# Patient Record
Sex: FEMALE
[De-identification: names, ages, dates, MRNs, and addresses within clinical notes are randomized per-mention and may not be internally consistent; named-entity substitution may affect disease eponyms.]

---

## 2017-02-21 ENCOUNTER — RESULT REVIEW (OUTPATIENT)
Age: 37
End: 2017-02-21

## 2017-07-19 PROBLEM — Z00.00 ENCOUNTER FOR PREVENTIVE HEALTH EXAMINATION: Status: ACTIVE | Noted: 2017-07-19

## 2019-03-08 ENCOUNTER — APPOINTMENT (OUTPATIENT)
Dept: PULMONOLOGY | Facility: CLINIC | Age: 39
End: 2019-03-08
Payer: COMMERCIAL

## 2019-03-08 VITALS
SYSTOLIC BLOOD PRESSURE: 134 MMHG | RESPIRATION RATE: 18 BRPM | HEIGHT: 65 IN | BODY MASS INDEX: 35.99 KG/M2 | DIASTOLIC BLOOD PRESSURE: 82 MMHG | WEIGHT: 216 LBS | HEART RATE: 81 BPM | OXYGEN SATURATION: 100 %

## 2019-03-08 DIAGNOSIS — R05 COUGH: ICD-10-CM

## 2019-03-08 PROCEDURE — 94729 DIFFUSING CAPACITY: CPT

## 2019-03-08 PROCEDURE — 94060 EVALUATION OF WHEEZING: CPT

## 2019-03-08 PROCEDURE — 99204 OFFICE O/P NEW MOD 45 MIN: CPT | Mod: 25

## 2019-03-08 PROCEDURE — 94727 GAS DIL/WSHOT DETER LNG VOL: CPT

## 2019-03-08 NOTE — HISTORY OF PRESENT ILLNESS
[FreeTextEntry1] : She is a 38-year-old woman. She has been coughing for about one month. Cough for the most part is nonproductive. She has tried several different medications. Some have helped but her cough has not completely resolved. Has never had any fever, chills or sweating. No flulike illness. Denies any sinus congestion or postnasal drip. Denies any reflux symptoms. She never smoked. No prior history of pulmonary problems. Her occupational history is unremarkable.

## 2019-03-08 NOTE — REVIEW OF SYSTEMS
[Cough] : cough [Fever] : no fever [Sputum] : not coughing up ~M sputum [Dyspnea] : no dyspnea [Wheezing] : no wheezing [Edema] : ~T edema was not present [Nasal Discharge] : no nasal discharge [Heartburn] : no heartburn [Myalgias] : no myalgias [Rash] : no [unfilled] rash [Depression] : no depression [Diabetes] : no diabetes mellitus [Thyroid Problem] : no thyroid problem [DVT] : no DVT [Difficulty Initiating Sleep] : no difficulty falling asleep [Difficulty Maintaining Sleep] : no difficulty maintaining sleep

## 2019-03-08 NOTE — PHYSICAL EXAM
[General Appearance - In No Acute Distress] : no acute distress [Neck Cervical Mass (___cm)] : no neck mass was observed [Heart Sounds] : normal S1 and S2 [Auscultation Breath Sounds / Voice Sounds] : lungs were clear to auscultation bilaterally [Bowel Sounds] : normal bowel sounds [Abdomen Tenderness] : non-tender [Abnormal Walk] : normal gait [Cyanosis, Localized] : no localized cyanosis [Skin Turgor] : normal skin turgor [No Focal Deficits] : no focal deficits [Oriented To Time, Place, And Person] : oriented to person, place, and time [Erythema] : no erythema of the pharynx

## 2019-03-08 NOTE — DISCUSSION/SUMMARY
[FreeTextEntry1] : She is a 38-year-old woman who has been coughing for about one month. The cough is nonproductive. She has no prior history of pulmonary problems.\par \par On examination her lungs were clear. Spirometry was normal although there was a minimal improvement after bronchodilator.\par \par The history and examination are suggestive of reactive airways disease. She was given a trial of Symbicort. A chest radiograph will be obtained. She is to call me if the cough does not resolve with a trial of simple clock.

## 2019-03-08 NOTE — PROCEDURE
[FreeTextEntry1] : Pulmonary function test March 8, 2019. Spirometry was normal. Mild restriction was present. Diffusion was normal. No significant change after administration of a bronchodilator.FEF 50 improved by 18%.

## 2021-03-11 ENCOUNTER — RESULT REVIEW (OUTPATIENT)
Age: 41
End: 2021-03-11

## 2024-03-15 ENCOUNTER — APPOINTMENT (OUTPATIENT)
Dept: OBGYN | Facility: CLINIC | Age: 44
End: 2024-03-15
Payer: COMMERCIAL

## 2024-03-15 VITALS
DIASTOLIC BLOOD PRESSURE: 90 MMHG | BODY MASS INDEX: 35.32 KG/M2 | HEIGHT: 65 IN | SYSTOLIC BLOOD PRESSURE: 130 MMHG | WEIGHT: 212 LBS

## 2024-03-15 DIAGNOSIS — Z82.49 FAMILY HISTORY OF ISCHEMIC HEART DISEASE AND OTHER DISEASES OF THE CIRCULATORY SYSTEM: ICD-10-CM

## 2024-03-15 DIAGNOSIS — I10 ESSENTIAL (PRIMARY) HYPERTENSION: ICD-10-CM

## 2024-03-15 DIAGNOSIS — D25.9 LEIOMYOMA OF UTERUS, UNSPECIFIED: ICD-10-CM

## 2024-03-15 DIAGNOSIS — Z78.9 OTHER SPECIFIED HEALTH STATUS: ICD-10-CM

## 2024-03-15 PROCEDURE — 99205 OFFICE O/P NEW HI 60 MIN: CPT

## 2024-03-17 PROBLEM — I10 CHRONIC HYPERTENSION: Status: ACTIVE | Noted: 2024-03-16

## 2024-03-17 PROBLEM — D25.9 FIBROID UTERUS: Status: ACTIVE | Noted: 2024-03-17

## 2024-03-17 RX ORDER — MEDROXYPROGESTERONE ACETATE 10 MG/1
10 TABLET ORAL
Refills: 0 | Status: ACTIVE | COMMUNITY

## 2024-03-17 RX ORDER — OLMESARTAN MEDOXOMIL 40 MG/1
TABLET, FILM COATED ORAL
Refills: 0 | Status: ACTIVE | COMMUNITY

## 2024-03-17 RX ORDER — NIFEDIPINE 90 MG/1
TABLET, EXTENDED RELEASE ORAL
Refills: 0 | Status: ACTIVE | COMMUNITY

## 2024-03-17 NOTE — DISCUSSION/SUMMARY
[FreeTextEntry1] : 42 y/o  with fibroid uterus, failed management with Mirena IUD, presenting to discuss hysterectomy. - Counseled on TLH as above - Discussed timing of surgery. Advised that in context of need to delay surgery until August, I would agree with plan for Lupron injection to stop bleeding until that time. Advised Lupron will shrink uterus as well, which does make surgery simpler, but that even at current size TLH can be accomplished, primary reason for Lupron from my perspective would be to stop bleeding prior to surgery. - EMB with only scant benign stromal cells, but in context of long Mirena use, fibroids as clear source of bleeding, and 8 mm endometrium on sono, this result is sufficient to proceed with hysterectomy. - Last pap normal 3/11/22 per scanned records - Will need medical clearance for surgery - Continue with plan for Lupron with Dr. Rust - Will have pre-op visit prior to surgery given scheduling far out from now  Dominic Weller MD

## 2024-03-17 NOTE — CONSULT LETTER
[Dear  ___] : Dear  [unfilled], [( Thank you for referring [unfilled] for consultation for _____ )] : Thank you for referring [unfilled] for consultation for [unfilled] [Consult Closing:] : Thank you very much for allowing me to participate in the care of this patient.  If you have any questions, please do not hesitate to contact me. [Please see my note below.] : Please see my note below. [Sincerely,] : Sincerely, [FreeTextEntry3] : Dominic Weller MD [FreeTextEntry1] : I have seen her in my office today and believe she is a good candidate for total laparoscopic hysterectomy. She has expressed to me your plan to administer Depot Lupron to her and her plan to likely delay surgery until August. I would agree that Lupron is a good idea in the interim to stop her bleeding prior her surgery since she needs to wait so long to schedule it. Shrinking the fibroids will also be nice, but she would still be a candidate for laparoscopic hysterectomy even at the current uterine size, I primarily recommended she stick with the Lupron plan to prevent worsening of her bleeding prior to surgery and advised she can keep her appointment to have that injected in your office as planned. My office will be working on scheduling her procedure, and once it is completed, I will be returning her to your excellent care.

## 2024-03-17 NOTE — COUNSELING
[TextEntry] : I sat down with the patient to discuss the imaging findings & her symptoms which warrant surgical intervention. I explained that her symptoms are likely related to her uterine pathology. She has failed medical interventions and desires hysterectomy. We discussed type of hysterectomy including total and supracervical. We reviewed that there is no proven medical benefit to keeping the cervix, and removal in the future is more difficult. I recommend definitive hysterectomy with bilateral salpingectomy via a minimally invasive approach.  The options for surgical approach including open, vaginal, and laparoscopic with or without robotic assistance were discussed and the patient agrees with plan for laparoscopic hysterectomy with bilateral salpingectomy. The differential diagnosis was discussed in detail. The indications, risks, benefits and alternatives were discussed. Including but not limited to conversion to laparotomy, bleeding, infection, injury to surrounding organs was discussed at length. Chance of occult injury and need for future surgery. Patient expressed an understanding of the treatment rationale and her questions were answered to her apparent satisfaction. She was given written information about postoperative care and diagrams of the pelvic anatomy.

## 2024-03-17 NOTE — PHYSICAL EXAM
[Chaperone Present] : A chaperone was present in the examining room during all aspects of the physical examination [Alert] : alert [Appropriately responsive] : appropriately responsive [No Acute Distress] : no acute distress [Oriented x3] : oriented x3 [Labia Minora] : normal [Labia Majora] : normal [Normal] : normal [FreeTextEntry6] : 18w sized mobile uterus, no adnexal masses

## 2024-03-17 NOTE — HISTORY OF PRESENT ILLNESS
[Patient reported mammogram was normal] : Patient reported mammogram was normal [Patient reported breast sonogram was normal] : Patient reported breast sonogram was normal [Patient reported PAP Smear was normal] : Patient reported PAP Smear was normal [LMP unknown] : LMP unknown [N] : Patient does not use contraception [Y] : Positive pregnancy history [unknown] : Patient is unsure of the date of her LMP [Menarche Age: ____] : age at menarche was [unfilled] [No] : Patient does not have concerns regarding sex [Currently Active] : currently active [Mammogramdate] : 2023 [FreeTextEntry1] : 42 y/o  with heavy, irregular bleeding presenting for consultation for hysterectomy. Patient is referred by Dr. Elly Rust. Patient has known fibroids for a long time. Had Mirena inserted 2022 for this which helped with bleeding a lot until 2023 where between this period she had heavy and irregular bleeding. Ultimately saw Dr. Rust, who checked a Hgb on  that was 11.6, and did an EMB on  that returned with mostly blood and "scanty strips of benign stromal tissue." She was prescribed 10 mg of nighly Provera on top of Mirena and bleeding has now stopped with this regimen. Given worsening of her bleeding however, she was referred for hysterectomy.  Of note, for logistic reasons with work and , patient is interested in surgery more in early August. As such, pt currently has plan for Depot Lupron injection with Dr. Rust later this month, and likely a second injection in the summer.  Pt had sono on 24 (scanned in) that showed multi-fibroid uterus, endometrium of 8 mm, and normal ovaries b/l  OBHx:  x1 [TextBox_19] : as per pt [TextBox_25] : as per pt [BreastSonogramDate] : 2023 [PGxTotal] : 1 [PapSmeardate] : 2023 [TextBox_31] : as per pt [[facility] Centeriving] : 1 [[facility] CenterxFulerm] : 1

## 2024-04-04 ENCOUNTER — TRANSCRIPTION ENCOUNTER (OUTPATIENT)
Age: 44
End: 2024-04-04

## 2024-07-18 ENCOUNTER — OUTPATIENT (OUTPATIENT)
Dept: OUTPATIENT SERVICES | Facility: HOSPITAL | Age: 44
LOS: 1 days | End: 2024-07-18
Payer: COMMERCIAL

## 2024-07-18 VITALS
DIASTOLIC BLOOD PRESSURE: 70 MMHG | WEIGHT: 214.95 LBS | RESPIRATION RATE: 20 BRPM | OXYGEN SATURATION: 99 % | HEIGHT: 66 IN | SYSTOLIC BLOOD PRESSURE: 120 MMHG | HEART RATE: 82 BPM | TEMPERATURE: 98 F

## 2024-07-18 DIAGNOSIS — Z29.9 ENCOUNTER FOR PROPHYLACTIC MEASURES, UNSPECIFIED: ICD-10-CM

## 2024-07-18 DIAGNOSIS — Z92.89 PERSONAL HISTORY OF OTHER MEDICAL TREATMENT: Chronic | ICD-10-CM

## 2024-07-18 DIAGNOSIS — I10 ESSENTIAL (PRIMARY) HYPERTENSION: ICD-10-CM

## 2024-07-18 DIAGNOSIS — Z13.89 ENCOUNTER FOR SCREENING FOR OTHER DISORDER: ICD-10-CM

## 2024-07-18 DIAGNOSIS — D25.9 LEIOMYOMA OF UTERUS, UNSPECIFIED: ICD-10-CM

## 2024-07-18 DIAGNOSIS — Z01.818 ENCOUNTER FOR OTHER PREPROCEDURAL EXAMINATION: ICD-10-CM

## 2024-07-18 LAB
A1C WITH ESTIMATED AVERAGE GLUCOSE RESULT: 5.7 % — HIGH (ref 4–5.6)
ALBUMIN SERPL ELPH-MCNC: 4.4 G/DL — SIGNIFICANT CHANGE UP (ref 3.3–5.2)
ALP SERPL-CCNC: 107 U/L — SIGNIFICANT CHANGE UP (ref 40–120)
ALT FLD-CCNC: 14 U/L — SIGNIFICANT CHANGE UP
ANION GAP SERPL CALC-SCNC: 10 MMOL/L — SIGNIFICANT CHANGE UP (ref 5–17)
APTT BLD: 31.6 SEC — SIGNIFICANT CHANGE UP (ref 24.5–35.6)
AST SERPL-CCNC: 19 U/L — SIGNIFICANT CHANGE UP
BASOPHILS # BLD AUTO: 0.04 K/UL — SIGNIFICANT CHANGE UP (ref 0–0.2)
BASOPHILS NFR BLD AUTO: 0.5 % — SIGNIFICANT CHANGE UP (ref 0–2)
BILIRUB SERPL-MCNC: 0.4 MG/DL — SIGNIFICANT CHANGE UP (ref 0.4–2)
BLD GP AB SCN SERPL QL: SIGNIFICANT CHANGE UP
BUN SERPL-MCNC: 8.4 MG/DL — SIGNIFICANT CHANGE UP (ref 8–20)
CALCIUM SERPL-MCNC: 9.7 MG/DL — SIGNIFICANT CHANGE UP (ref 8.4–10.5)
CHLORIDE SERPL-SCNC: 106 MMOL/L — SIGNIFICANT CHANGE UP (ref 96–108)
CO2 SERPL-SCNC: 25 MMOL/L — SIGNIFICANT CHANGE UP (ref 22–29)
CREAT SERPL-MCNC: 0.57 MG/DL — SIGNIFICANT CHANGE UP (ref 0.5–1.3)
EGFR: 114 ML/MIN/1.73M2 — SIGNIFICANT CHANGE UP
EOSINOPHIL # BLD AUTO: 0.06 K/UL — SIGNIFICANT CHANGE UP (ref 0–0.5)
EOSINOPHIL NFR BLD AUTO: 0.7 % — SIGNIFICANT CHANGE UP (ref 0–6)
ESTIMATED AVERAGE GLUCOSE: 117 MG/DL — HIGH (ref 68–114)
GLUCOSE SERPL-MCNC: 89 MG/DL — SIGNIFICANT CHANGE UP (ref 70–99)
HCG SERPL-ACNC: <4 MIU/ML — SIGNIFICANT CHANGE UP
HCT VFR BLD CALC: 38.2 % — SIGNIFICANT CHANGE UP (ref 34.5–45)
HGB BLD-MCNC: 11.8 G/DL — SIGNIFICANT CHANGE UP (ref 11.5–15.5)
IMM GRANULOCYTES NFR BLD AUTO: 0.2 % — SIGNIFICANT CHANGE UP (ref 0–0.9)
INR BLD: 1.14 RATIO — SIGNIFICANT CHANGE UP (ref 0.85–1.18)
LYMPHOCYTES # BLD AUTO: 2.33 K/UL — SIGNIFICANT CHANGE UP (ref 1–3.3)
LYMPHOCYTES # BLD AUTO: 26.9 % — SIGNIFICANT CHANGE UP (ref 13–44)
MCHC RBC-ENTMCNC: 22.5 PG — LOW (ref 27–34)
MCHC RBC-ENTMCNC: 30.9 GM/DL — LOW (ref 32–36)
MCV RBC AUTO: 72.9 FL — LOW (ref 80–100)
MONOCYTES # BLD AUTO: 0.66 K/UL — SIGNIFICANT CHANGE UP (ref 0–0.9)
MONOCYTES NFR BLD AUTO: 7.6 % — SIGNIFICANT CHANGE UP (ref 2–14)
NEUTROPHILS # BLD AUTO: 5.54 K/UL — SIGNIFICANT CHANGE UP (ref 1.8–7.4)
NEUTROPHILS NFR BLD AUTO: 64.1 % — SIGNIFICANT CHANGE UP (ref 43–77)
PLATELET # BLD AUTO: 282 K/UL — SIGNIFICANT CHANGE UP (ref 150–400)
POTASSIUM SERPL-MCNC: 4.5 MMOL/L — SIGNIFICANT CHANGE UP (ref 3.5–5.3)
POTASSIUM SERPL-SCNC: 4.5 MMOL/L — SIGNIFICANT CHANGE UP (ref 3.5–5.3)
PROT SERPL-MCNC: 8 G/DL — SIGNIFICANT CHANGE UP (ref 6.6–8.7)
PROTHROM AB SERPL-ACNC: 12.6 SEC — SIGNIFICANT CHANGE UP (ref 9.5–13)
RBC # BLD: 5.24 M/UL — HIGH (ref 3.8–5.2)
RBC # FLD: 14.5 % — SIGNIFICANT CHANGE UP (ref 10.3–14.5)
SODIUM SERPL-SCNC: 141 MMOL/L — SIGNIFICANT CHANGE UP (ref 135–145)
WBC # BLD: 8.65 K/UL — SIGNIFICANT CHANGE UP (ref 3.8–10.5)
WBC # FLD AUTO: 8.65 K/UL — SIGNIFICANT CHANGE UP (ref 3.8–10.5)

## 2024-07-18 PROCEDURE — 93005 ELECTROCARDIOGRAM TRACING: CPT

## 2024-07-18 PROCEDURE — 85730 THROMBOPLASTIN TIME PARTIAL: CPT

## 2024-07-18 PROCEDURE — 86850 RBC ANTIBODY SCREEN: CPT

## 2024-07-18 PROCEDURE — 85025 COMPLETE CBC W/AUTO DIFF WBC: CPT

## 2024-07-18 PROCEDURE — 86900 BLOOD TYPING SEROLOGIC ABO: CPT

## 2024-07-18 PROCEDURE — 36415 COLL VENOUS BLD VENIPUNCTURE: CPT

## 2024-07-18 PROCEDURE — 86901 BLOOD TYPING SEROLOGIC RH(D): CPT

## 2024-07-18 PROCEDURE — 84702 CHORIONIC GONADOTROPIN TEST: CPT

## 2024-07-18 PROCEDURE — 80053 COMPREHEN METABOLIC PANEL: CPT

## 2024-07-18 PROCEDURE — 85610 PROTHROMBIN TIME: CPT

## 2024-07-18 PROCEDURE — G0463: CPT

## 2024-07-18 PROCEDURE — 93010 ELECTROCARDIOGRAM REPORT: CPT

## 2024-07-18 PROCEDURE — 83036 HEMOGLOBIN GLYCOSYLATED A1C: CPT

## 2024-07-18 RX ORDER — MEDROXYPROGESTERONE ACETATE 2.5 MG/1
1 TABLET ORAL
Refills: 0 | DISCHARGE

## 2024-07-18 RX ORDER — OLMESARTAN MEDOXOMIL 5 MG/1
1 TABLET, FILM COATED ORAL
Refills: 0 | DISCHARGE

## 2024-07-18 RX ORDER — LEUPROLIDE ACETATE 15 MG
0 KIT INTRAMUSCULAR
Refills: 0 | DISCHARGE

## 2024-07-18 RX ORDER — LEVONORGESTREL 1.5 MG/1
1 TABLET ORAL
Refills: 0 | DISCHARGE

## 2024-07-18 NOTE — H&P PST ADULT - ASSESSMENT
CAPRINI SCORE    AGE RELATED RISK FACTORS                                                             [x ] Age 41-60 years                                            (1 Point)  [ ] Age: 61-74 years                                           (2 Points)                 [ ] Age= 75 years                                                (3 Points)             DISEASE RELATED RISK FACTORS                                                       [ ] Edema in the lower extremities                 (1 Point)                     [ ] Varicose veins                                               (1 Point)                                 [x ] BMI > 25 Kg/m2                                            (1 Point)                                  [ ] Serious infection (ie PNA)                            (1 Point)                     [ ] Lung disease ( COPD, Emphysema)            (1 Point)                                                                          [ ] Acute myocardial infarction                         (1 Point)                  [ ] Congestive heart failure (in the previous month)  (1 Point)         [ ] Inflammatory bowel disease                            (1 Point)                  [ ] Central venous access, PICC or Port               (2 points)       (within the last month)                                                                [ ] Stroke (in the previous month)                        (5 Points)    [ ] Previous or present malignancy                       (2 points)                                                                                                                                                         HEMATOLOGY RELATED FACTORS                                                         [ ] Prior episodes of VTE                                     (3 Points)                     [ ] Positive family history for VTE                      (3 Points)                  [ ] Prothrombin 54643 A                                     (3 Points)                     [ ] Factor V Leiden                                                (3 Points)                        [ ] Lupus anticoagulants                                      (3 Points)                                                           [ ] Anticardiolipin antibodies                              (3 Points)                                                       [ ] High homocysteine in the blood                   (3 Points)                                             [ ] Other congenital or acquired thrombophilia      (3 Points)                                                [ ] Heparin induced thrombocytopenia                  (3 Points)                                        MOBILITY RELATED FACTORS  [ ] Bed rest                                                         (1 Point)  [ ] Plaster cast                                                    (2 points)  [ ] Bed bound for more than 72 hours           (2 Points)    GENDER SPECIFIC FACTORS  [ ] Pregnancy or had a baby within the last month   (1 Point)  [ ] Post-partum < 6 weeks                                   (1 Point)  [x ] Hormonal therapy  or oral contraception   (1 Point)  [ ] History of pregnancy complications              (1 point)  [ ] Unexplained or recurrent              (1 Point)    OTHER RISK FACTORS                                           (1 Point)  [ x] BMI >40, smoking, diabetes requiring insulin, chemotherapy  blood transfusions and length of surgery over 2 hours    SURGERY RELATED RISK FACTORS  [ ]  Section within the last month     (1 Point)  [ ] Minor surgery                                                  (1 Point)  [ ] Arthroscopic surgery                                       (2 Points)  [x ] Planned major surgery lasting more            (2 Points)      than 45 minutes     [ ] Elective hip or knee joint replacement       (5 points)       surgery                                                TRAUMA RELATED RISK FACTORS  [ ] Fracture of the hip, pelvis, or leg                       (5 Points)  [ ] Spinal cord injury resulting in paralysis             (5 points)       (in the previous month)    [ ] Paralysis  (less than 1 month)                             (5 Points)  [ ] Multiple Trauma within 1 month                        (5 Points)    Total Score [  6      ]    Caprini Score 0-2: Low Risk, NO VTE prophylaxis required for most patients, encourage ambulation  Caprini Score 3-6: Moderate Risk , pharmacologic VTE prophylaxis is indicated for most patients (in the absence of contraindications)  Caprini Score Greater than or =7: High risk, pharmocologic VTE prophylaxis indicated for most patients (in the absence of contraindications)    OPIOID RISK TOOL    RAJESH EACH BOX THAT APPLIES AND ADD TOTALS AT THE END    FAMILY HISTORY OF SUBSTANCE ABUSE                 FEMALE         MALE                                                Alcohol                             [  ]1 pt          [  ]3pts                                               Illegal Durgs                     [  ]2 pts        [  ]3pts                                               Rx Drugs                           [  ]4 pts        [  ]4 pts    PERSONAL HISTORY OF SUBSTANCE ABUSE                                                                                          Alcohol                             [  ]3 pts       [  ]3 pts                                               Illegal Drugs                     [  ]4 pts        [  ]4 pts                                               Rx Drugs                           [  ]5 pts        [  ]5 pts    AGE BETWEEN 16-45 YEARS                                      [ x ]1 pt         [  ]1 pt    HISTORY OF PREADOLESCENT   SEXUAL ABUSE                                                             [  ]3 pts        [  ]0pts    PSYCHOLOGICAL DISEASE                     ADD, OCD, Bipolar, Schizophrenia        [  ]2 pts         [  ]2 pts                      Depression                                               [  ]1 pt           [  ]1 pt           SCORING TOTAL   (add numbers and type here)              (1)                                     A score of 3 or lower indicated LOW risk for future opioid abuse  A score of 4 to 7 indicated moderate risk for future opioid abuse  A score of 8 or higher indicates a high risk for opioid abuse     44yo F patient of DR. Weller,  LMP2024, irregular bleeding, IUD in place,Pmhx Htn/ heavy, irregular uterine bleeding, referred to DR. Weller by Dr. Elly Rust, presents for PST 2024 for planned hysterectomy. known hx fibroids, Mirena inserted 2022 which reportedly helped with bleeding, reports bleeding worsened around 2023 reports heavy and irregular bleeding. she saw Dr. Rust, Hgb on  that was 11.6, EMB performed on  revealed mostly blood and "scanty strips of benign stromal tissue." as per GYN reports. she is taking Provera 10 HS, c/w Mirena, and reports bleeding resolved for now, US 24 revealed multi-fibroid uterus, endometrium of 8 mm, and normal ovaries b/l, as per DR. Weller- discuss imaging findings & her symptoms which warrant surgical intervention, symptoms are likely related to her uterine pathology, failed medical interventions and patient desires hysterectomy, EMB with only scant benign stromal cells, but in context of long Mirena use, fibroids as clear source of bleeding, and 8 mm endometrium on sono, this result is sufficient to proceed with hysterectomy- discussed type of hysterectomy including total and supracervical, recommend definitive hysterectomy with bilateral salpingectomy via a minimally invasive approach, Patient expressed an understanding of the treatment rationale and her questions were answered to her apparent satisfaction, plan for Lupron injection to stop bleeding until that time, will shrink uterus as well, which does make surgery simpler, but that even at current size TLH can be accomplished, primary reason for Lupron would be to stop bleeding prior to surgery. she is scheduled 2024 for total lap hysterectomy thais salpingectomy cystoscopy and related procedures with Dr. Weller. she o/w reports feeling well today with no other acute concerns.       CAPRINI SCORE    AGE RELATED RISK FACTORS                                                             [x ] Age 41-60 years                                            (1 Point)  [ ] Age: 61-74 years                                           (2 Points)                 [ ] Age= 75 years                                                (3 Points)             DISEASE RELATED RISK FACTORS                                                       [ ] Edema in the lower extremities                 (1 Point)                     [ ] Varicose veins                                               (1 Point)                                 [x ] BMI > 25 Kg/m2                                            (1 Point)                                  [ ] Serious infection (ie PNA)                            (1 Point)                     [ ] Lung disease ( COPD, Emphysema)            (1 Point)                                                                          [ ] Acute myocardial infarction                         (1 Point)                  [ ] Congestive heart failure (in the previous month)  (1 Point)         [ ] Inflammatory bowel disease                            (1 Point)                  [ ] Central venous access, PICC or Port               (2 points)       (within the last month)                                                                [ ] Stroke (in the previous month)                        (5 Points)    [ ] Previous or present malignancy                       (2 points)                                                                                                                                                         HEMATOLOGY RELATED FACTORS                                                         [ ] Prior episodes of VTE                                     (3 Points)                     [ ] Positive family history for VTE                      (3 Points)                  [ ] Prothrombin 51886 A                                     (3 Points)                     [ ] Factor V Leiden                                                (3 Points)                        [ ] Lupus anticoagulants                                      (3 Points)                                                           [ ] Anticardiolipin antibodies                              (3 Points)                                                       [ ] High homocysteine in the blood                   (3 Points)                                             [ ] Other congenital or acquired thrombophilia      (3 Points)                                                [ ] Heparin induced thrombocytopenia                  (3 Points)                                        MOBILITY RELATED FACTORS  [ ] Bed rest                                                         (1 Point)  [ ] Plaster cast                                                    (2 points)  [ ] Bed bound for more than 72 hours           (2 Points)    GENDER SPECIFIC FACTORS  [ ] Pregnancy or had a baby within the last month   (1 Point)  [ ] Post-partum < 6 weeks                                   (1 Point)  [x ] Hormonal therapy  or oral contraception   (1 Point)  [ ] History of pregnancy complications              (1 point)  [ ] Unexplained or recurrent              (1 Point)    OTHER RISK FACTORS                                           (1 Point)  [ x] BMI >40, smoking, diabetes requiring insulin, chemotherapy  blood transfusions and length of surgery over 2 hours    SURGERY RELATED RISK FACTORS  [ ]  Section within the last month     (1 Point)  [ ] Minor surgery                                                  (1 Point)  [ ] Arthroscopic surgery                                       (2 Points)  [x ] Planned major surgery lasting more            (2 Points)      than 45 minutes     [ ] Elective hip or knee joint replacement       (5 points)       surgery                                                TRAUMA RELATED RISK FACTORS  [ ] Fracture of the hip, pelvis, or leg                       (5 Points)  [ ] Spinal cord injury resulting in paralysis             (5 points)       (in the previous month)    [ ] Paralysis  (less than 1 month)                             (5 Points)  [ ] Multiple Trauma within 1 month                        (5 Points)    Total Score [  6      ]    Caprini Score 0-2: Low Risk, NO VTE prophylaxis required for most patients, encourage ambulation  Caprini Score 3-6: Moderate Risk , pharmacologic VTE prophylaxis is indicated for most patients (in the absence of contraindications)  Caprini Score Greater than or =7: High risk, pharmocologic VTE prophylaxis indicated for most patients (in the absence of contraindications)    OPIOID RISK TOOL    RAJESH EACH BOX THAT APPLIES AND ADD TOTALS AT THE END    FAMILY HISTORY OF SUBSTANCE ABUSE                 FEMALE         MALE                                                Alcohol                             [  ]1 pt          [  ]3pts                                               Illegal Durgs                     [  ]2 pts        [  ]3pts                                               Rx Drugs                           [  ]4 pts        [  ]4 pts    PERSONAL HISTORY OF SUBSTANCE ABUSE                                                                                          Alcohol                             [  ]3 pts       [  ]3 pts                                               Illegal Drugs                     [  ]4 pts        [  ]4 pts                                               Rx Drugs                           [  ]5 pts        [  ]5 pts    AGE BETWEEN 16-45 YEARS                                      [ x ]1 pt         [  ]1 pt    HISTORY OF PREADOLESCENT   SEXUAL ABUSE                                                             [  ]3 pts        [  ]0pts    PSYCHOLOGICAL DISEASE                     ADD, OCD, Bipolar, Schizophrenia        [  ]2 pts         [  ]2 pts                      Depression                                               [  ]1 pt           [  ]1 pt           SCORING TOTAL   (add numbers and type here)              (1)                                     A score of 3 or lower indicated LOW risk for future opioid abuse  A score of 4 to 7 indicated moderate risk for future opioid abuse  A score of 8 or higher indicates a high risk for opioid abuse

## 2024-07-18 NOTE — H&P PST ADULT - PROBLEM SELECTOR PLAN 3
cap 6  Moderate Risk,  SCDs ordered for day of procedure.  Surgical team to assess need for VTE prophylaxis

## 2024-07-18 NOTE — H&P PST ADULT - NSICDXPASTMEDICALHX_GEN_ALL_CORE_FT
PAST MEDICAL HISTORY:  History of heavy vaginal bleeding     HTN (hypertension)     Irregular uterine bleeding     Presence of IUD     Uterine fibroid

## 2024-07-18 NOTE — H&P PST ADULT - PROBLEM SELECTOR PLAN 1
scheduled 08/14/2024 for total lap hysterectomy thais salpingectomy cystoscopy and related procedures with Dr. Weller.  Patient educated on clearances, surgical scrub, ERAS,  labs/diagnostics performed, preadmission instructions,  and day of procedure medications- verbalized understanding, teach back method utilized.   pending medical clearance  advised on medication use as per periop protocol (see written forms)  Stop vitamins/ supplements/ NSAIDs 5 days prior to procedure.

## 2024-07-18 NOTE — H&P PST ADULT - HISTORY OF PRESENT ILLNESS
42yo F patient of DR. Weller,  LMP ,Pmhx Htn/ heavy, irregular uterine bleeding, referred to DR. Weller by Dr. Elly Rust, presents for PST 2024 for planned hysterectomy. known hx fibroids, Mirena inserted 2022 which reportedly helped with bleeding, reports bleeding worsened around 2023 reports heavy and irregular bleeding. she saw Dr. Rust, Hgb on  that was 11.6, EMB performed on  revealed mostly blood and "scanty strips of benign stromal tissue." as per GYN reports. she is taking Provera 10 HS, c/w Mirena, and reports bleeding resolved for now, US 24 revealed multi-fibroid uterus, endometrium of 8 mm, and normal ovaries b/l, as per DR. Weller- discuss imaging findings & her symptoms which warrant surgical intervention, symptoms are likely related to her uterine pathology, failed medical interventions and patient desires hysterectomy, EMB with only scant benign stromal cells, but in context of long Mirena use, fibroids as clear source of bleeding, and 8 mm endometrium on sono, this result is sufficient to proceed with hysterectomy- discussed type of hysterectomy including total and supracervical, recommend definitive hysterectomy with bilateral salpingectomy via a minimally invasive approach, Patient expressed an understanding of the treatment rationale and her questions were answered to her apparent satisfaction, plan for Lupron injection to stop bleeding until that time, will shrink uterus as well, which does make surgery simpler, but that even at current size TLH can be accomplished, primary reason for Lupron would be to stop bleeding prior to surgery. she is scheduled 2024 for total lap hysterectomy thais salpingectomy cystoscopy and related procedures with Dr. Weller. she o/w reports feeling well today with no other acute concerns.  44yo F patient of DR. Weller,  LMP2024, irregular bleeding, IUD in place,Pmhx Htn/ heavy, irregular uterine bleeding, referred to DR. Weller by Dr. Elly Rust, presents for PST 2024 for planned hysterectomy. known hx fibroids, Mirena inserted 2022 which reportedly helped with bleeding, reports bleeding worsened around 2023 reports heavy and irregular bleeding. she saw Dr. Rust, Hgb on  that was 11.6, EMB performed on  revealed mostly blood and "scanty strips of benign stromal tissue." as per GYN reports. she is taking Provera 10 HS, c/w Mirena, and reports bleeding resolved for now, US 24 revealed multi-fibroid uterus, endometrium of 8 mm, and normal ovaries b/l, as per DR. Weller- discuss imaging findings & her symptoms which warrant surgical intervention, symptoms are likely related to her uterine pathology, failed medical interventions and patient desires hysterectomy, EMB with only scant benign stromal cells, but in context of long Mirena use, fibroids as clear source of bleeding, and 8 mm endometrium on sono, this result is sufficient to proceed with hysterectomy- discussed type of hysterectomy including total and supracervical, recommend definitive hysterectomy with bilateral salpingectomy via a minimally invasive approach, Patient expressed an understanding of the treatment rationale and her questions were answered to her apparent satisfaction, plan for Lupron injection to stop bleeding until that time, will shrink uterus as well, which does make surgery simpler, but that even at current size TLH can be accomplished, primary reason for Lupron would be to stop bleeding prior to surgery. she is scheduled 2024 for total lap hysterectomy thasi salpingectomy cystoscopy and related procedures with Dr. Weller. she o/w reports feeling well today with no other acute concerns.

## 2024-08-02 ENCOUNTER — APPOINTMENT (OUTPATIENT)
Dept: OBGYN | Facility: CLINIC | Age: 44
End: 2024-08-02
Payer: COMMERCIAL

## 2024-08-02 DIAGNOSIS — D25.9 LEIOMYOMA OF UTERUS, UNSPECIFIED: ICD-10-CM

## 2024-08-02 PROCEDURE — 99213 OFFICE O/P EST LOW 20 MIN: CPT | Mod: 95

## 2024-08-02 RX ORDER — BISACODYL 5 MG/1
5 TABLET, DELAYED RELEASE ORAL
Qty: 4 | Refills: 0 | Status: ACTIVE | COMMUNITY
Start: 2024-08-02 | End: 1900-01-01

## 2024-08-02 RX ORDER — POLYETHYLENE GLYCOL 3350 AND ELECTROLYTES WITH LEMON FLAVOR 236; 22.74; 6.74; 5.86; 2.97 G/4L; G/4L; G/4L; G/4L; G/4L
236 POWDER, FOR SOLUTION ORAL
Qty: 1 | Refills: 0 | Status: ACTIVE | COMMUNITY
Start: 2024-08-02 | End: 1900-01-01

## 2024-08-04 NOTE — DISCUSSION/SUMMARY
[FreeTextEntry1] : 42 y/o  with fibroid uterus, failed management with Mirena IUD, presenting for pre-op visit in preparation for hysterectomy. - Counseled on TLH as above - Discussed recommendation for bowel preparation, script sent today. - EMB with only scant benign stromal cells, but in context of long Mirena use, fibroids as clear source of bleeding, and 8 mm endometrium on sono, this result is sufficient to proceed with hysterectomy. - Last pap normal 3/11/22 per scanned records - Will need medical clearance for surgery - Keep planned TLH, b/l salpingectomy, cystoscopy on   Dominic Weller MD.

## 2024-08-04 NOTE — HISTORY OF PRESENT ILLNESS
[FreeTextEntry1] : 42 y/o  with fibroid uterus, failed management with Mirena IUD, presenting for pre-op visit in preparation for hysterectomy. No new complaints today. Received most recent 3 month [place] Lupron dose with Dr. Rust late .

## 2024-08-04 NOTE — REASON FOR VISIT
[TextEntry] : This visit was provided via telehealth using real-time 2-way audio visual technology. The patient, JOYCELYN ZAMUDIO, was located at home at the time of the visit, at the following address: [address] [place] [address] The provider, Dr. Dominic Weller, was located at the medical office located at [address] [place] [address] [address] [address] at the time of the visit. The patient, JOYCELYN ZAMUDIO and Provider participated in the telehealth encounter. Verbal consent given on 08/02/2024 by the patient, self.

## 2024-08-13 ENCOUNTER — NON-APPOINTMENT (OUTPATIENT)
Age: 44
End: 2024-08-13

## 2024-08-14 ENCOUNTER — TRANSCRIPTION ENCOUNTER (OUTPATIENT)
Age: 44
End: 2024-08-14

## 2024-08-14 ENCOUNTER — APPOINTMENT (OUTPATIENT)
Dept: OBGYN | Facility: HOSPITAL | Age: 44
End: 2024-08-14

## 2024-08-14 ENCOUNTER — RESULT REVIEW (OUTPATIENT)
Age: 44
End: 2024-08-14

## 2024-08-15 ENCOUNTER — TRANSCRIPTION ENCOUNTER (OUTPATIENT)
Age: 44
End: 2024-08-15

## 2024-08-20 ENCOUNTER — NON-APPOINTMENT (OUTPATIENT)
Age: 44
End: 2024-08-20

## 2024-08-26 ENCOUNTER — APPOINTMENT (OUTPATIENT)
Dept: OBGYN | Facility: CLINIC | Age: 44
End: 2024-08-26
Payer: COMMERCIAL

## 2024-08-26 VITALS
DIASTOLIC BLOOD PRESSURE: 76 MMHG | BODY MASS INDEX: 35.16 KG/M2 | HEIGHT: 65 IN | WEIGHT: 211 LBS | SYSTOLIC BLOOD PRESSURE: 120 MMHG

## 2024-08-26 DIAGNOSIS — D25.9 LEIOMYOMA OF UTERUS, UNSPECIFIED: ICD-10-CM

## 2024-08-26 PROBLEM — N92.6 IRREGULAR MENSTRUATION, UNSPECIFIED: Chronic | Status: ACTIVE | Noted: 2024-07-18

## 2024-08-26 PROBLEM — I10 ESSENTIAL (PRIMARY) HYPERTENSION: Chronic | Status: ACTIVE | Noted: 2024-07-18

## 2024-08-26 PROBLEM — Z87.42 PERSONAL HISTORY OF OTHER DISEASES OF THE FEMALE GENITAL TRACT: Chronic | Status: ACTIVE | Noted: 2024-07-18

## 2024-08-26 PROBLEM — Z97.5 PRESENCE OF (INTRAUTERINE) CONTRACEPTIVE DEVICE: Chronic | Status: ACTIVE | Noted: 2024-07-18

## 2024-08-26 PROCEDURE — 99024 POSTOP FOLLOW-UP VISIT: CPT

## 2024-08-26 NOTE — CONSULT LETTER
[Dear  ___] : Dear  [unfilled], [Courtesy Letter:] : I had the pleasure of seeing your patient, [unfilled], in my office today. [Please see my note below.] : Please see my note below. [Consult Closing:] : Thank you very much for allowing me to participate in the care of this patient.  If you have any questions, please do not hesitate to contact me. [Sincerely,] : Sincerely, [FreeTextEntry1] : She underwent a successful TLH on 8/14 and is recovering well. I will be seeing her for her vaginal cuff check again in 4-5 weeks and if all is still well then, she will be returning to your excellent care. I am separately faxing a copy of her operative report and pathology. [FreeTextEntry3] : Dominic Weller MD

## 2024-08-26 NOTE — PLAN
[FreeTextEntry1] : s/p TLH, b/l salpingectomy, cystoscopy on 8/14 presenting for post-op visit. Superficial separation of left lateral incision, but dermis intact, no signs of infection. - Steri strips placed on left incision to keep covered, pt instructed to remove in a week. Counseled that if purulent drainage occurs or tenderness develops, to call office as would treat as a wound cellulitis if this were to occur. Pt expressed understanding. - Pathology reviewed and benign - No heavy lifting or submerging in water for 6 weeks from surgery, no sex for 12 weeks from surgery. - Return for vaginal cuff check in 4-5w  Dominic Weller MD

## 2024-08-26 NOTE — HISTORY OF PRESENT ILLNESS
[Pain is well-controlled] : pain is well-controlled [Fever] : no fever [Chills] : no chills [Nausea] : no nausea [Vomiting] : no vomiting [Pathology reviewed] : pathology reviewed [de-identified] : 08/14/2024 [de-identified] : TLH, BS, Cystoscopy [de-identified] : s/p TLH, b/l salpingectomy, cystoscopy on 8/14 presenting for post-op visit. Reports LLQ incision seems more pronounced and had some drainage, but otherwise no increased pain, no other complaints. [de-identified] : Left lateral port with separation of epidermis but intact dermis, no purulent drainage, no tenderness, no erythema. All other 3 ports CDI.

## 2024-08-29 ENCOUNTER — NON-APPOINTMENT (OUTPATIENT)
Age: 44
End: 2024-08-29

## 2024-09-03 ENCOUNTER — NON-APPOINTMENT (OUTPATIENT)
Age: 44
End: 2024-09-03

## 2024-09-23 ENCOUNTER — APPOINTMENT (OUTPATIENT)
Dept: OBGYN | Facility: CLINIC | Age: 44
End: 2024-09-23
Payer: COMMERCIAL

## 2024-09-23 VITALS
DIASTOLIC BLOOD PRESSURE: 78 MMHG | BODY MASS INDEX: 34.99 KG/M2 | SYSTOLIC BLOOD PRESSURE: 126 MMHG | WEIGHT: 210 LBS | HEIGHT: 65 IN

## 2024-09-23 DIAGNOSIS — D25.9 LEIOMYOMA OF UTERUS, UNSPECIFIED: ICD-10-CM

## 2024-09-23 PROCEDURE — 99024 POSTOP FOLLOW-UP VISIT: CPT

## 2024-09-23 NOTE — PLAN
[FreeTextEntry1] : s/p TLH, b/l salpingectomy, cystoscopy on 8/14 presenting for post-op visit and cuff check. Some discomfort still with sitting for prolonged periods but exam normal, likely routine for post-op combined with some deconditioning. - Discussed full recovery from surgery can take up to 3 months and would anticipate her symptoms may just take some more time to resolve. - Work letter provided to allow patient accommodations to work at home for another 5 weeks or to allow reasonable accommodations at work for her to not have to sit or  the same position for too long. - No sex for 12 weeks post-op, otherwise can return to all normal activity. - Advised if symptoms resolve as anticipated, no need to return to this office, but if still having symptoms a month from now, advised to call to make an appointment for further evaluation. Advised PT may be needed at that time if symptoms still persistent. - Can return to routine gyn care after 12 weeks post-op  Dominic Weller MD

## 2024-09-23 NOTE — CONSULT LETTER
[Dear  ___] : Dear  [unfilled], [Courtesy Letter:] : I had the pleasure of seeing your patient, [unfilled], in my office today. [Please see my note below.] : Please see my note below. [Consult Closing:] : Thank you very much for allowing me to participate in the care of this patient.  If you have any questions, please do not hesitate to contact me. [Sincerely,] : Sincerely, [FreeTextEntry1] : She underwent a successful TLH on 8/14 and is recovering well. She is still having some residual soreness which is not concerning on exam today, likely routine for post-op. I counseled her that her symptoms should resolve within another 4-6 weeks, and that if not, she should call my office for further evaluation. Assuming they resolve as anticipated however, she is clear to return to your excellent care. [FreeTextEntry3] : Dominic Weller MD

## 2024-09-23 NOTE — HISTORY OF PRESENT ILLNESS
[Pain is well-controlled] : pain is well-controlled [Fever] : no fever [Chills] : no chills [Nausea] : no nausea [Vomiting] : no vomiting [de-identified] : 08/14/2024 [de-identified] : TLH, BS, Cystoscopy [de-identified] : s/p TLH, b/l salpingectomy, cystoscopy on 8/14 presenting for post-op visit. Reports that she still has some abdominal soreness when sitting upright in the same position for too long, as well as some occasional back pain. Otherwise no other complaints, pain well controlled. [de-identified] : Left lateral port now healing well. All 4 ports CDI. Abdomen soft, non-tender, non-distended. Vaginal cuff intact on visualization and palpation, no tenderness.